# Patient Record
Sex: FEMALE
[De-identification: names, ages, dates, MRNs, and addresses within clinical notes are randomized per-mention and may not be internally consistent; named-entity substitution may affect disease eponyms.]

---

## 2019-09-23 ENCOUNTER — HOSPITAL ENCOUNTER (EMERGENCY)
Dept: HOSPITAL 56 - MW.ED | Age: 59
Discharge: HOME | End: 2019-09-23
Payer: COMMERCIAL
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
## 2019-09-23 DIAGNOSIS — Z23: ICD-10-CM

## 2019-09-23 DIAGNOSIS — S61.217A: Primary | ICD-10-CM

## 2019-09-23 PROCEDURE — 90715 TDAP VACCINE 7 YRS/> IM: CPT

## 2019-09-23 PROCEDURE — 90471 IMMUNIZATION ADMIN: CPT

## 2019-09-23 PROCEDURE — 99282 EMERGENCY DEPT VISIT SF MDM: CPT

## 2019-09-23 PROCEDURE — 12001 RPR S/N/AX/GEN/TRNK 2.5CM/<: CPT

## 2019-09-23 NOTE — EDM.PDOC
ED HPI GENERAL MEDICAL PROBLEM





- General


Chief Complaint: Laceration


Stated Complaint: CUT ON FINGER


Time Seen by Provider: 09/23/19 17:24


Source of Information: Reports: Patient


History Limitations: Reports: No Limitations





- History of Present Illness


INITIAL COMMENTS - FREE TEXT/NARRATIVE: 


HISTORY AND PHYSICAL:





History of present illness:


Patient is a 59-year-old female who presents to the emergency room today with 

complaints of a laceration to the left distal fifth digit. She states she was 

cutting food with a knife when the food had slipped away resulting in a 

laceration. Patient denies any fever, chills, headache, change in vision, 

syncope or near syncope. Denies any chest pain, back pain, shortness of breath 

or cough. Denies any GI or  symptoms.


Unsure of last tetanus up-to-date.





Review of systems: 


As per history of present illness and below otherwise all systems reviewed and 

negative.





Past medical history: 


As per history of present illness and as reviewed below otherwise 

noncontributory.





Surgical history: 


As per history of present illness and as reviewed below otherwise 

noncontributory.





Social history: 


See social history for further information





Family history: 


As per history of present illness and as reviewed below otherwise 

noncontributory.





Physical exam:


General: Well-developed and well-nourished 59-year-old female. Alert and 

oriented. Nontoxic appearing and in no acute distress.


HEENT: Atraumatic, normocephalic, pupils equal and reactive bilaterally, 

negative for conjunctival pallor or scleral icterus, mucous membranes moist, 

trachea midline. No drooling or trismus noted. No meningeal signs. No hot 

potato voice noted. 


Lungs: Clear to auscultation, breath sounds equal bilaterally, chest nontender.


Heart: S1S2, regular rate and rhythm without overt murmur


Abdomen: Soft, nondistended, nontender. 


Skin: 1.3 cm "C" shaped laceration to distal aspect of left 5th digit. The 

laceration is on the pad of the finger and does not extend into the nail bed. 

Otherwise skin is intact, warm, dry. No lesions or rashes noted.


Extremities: Atraumatic, moves all extremities per self without difficulty or 

deficits, negative for cords or calf pain. Neurovascular unremarkable.


Neuro: Awake, alert, oriented. Cranial nerves II through XII unremarkable. 

Cerebellum unremarkable. Motor and sensory unremarkable throughout. Exam 

nonfocal.





Notes:


Area was thoroughly cleansed and irrigated with wound wash. Usual and customary 

procedures were followed for suture placement. 4-0 chromic # 3interrupted 

sutures were applied. Patient tolerated well. Supportive care measures were 

reviewed and discussed. Voices understanding and is agreeable to plan of care. 

Denies any further questions or concerns at this time.





Diagnostics:


None





Therapeutics:


Lidocaine, Tdap





Prescription:


None





Impression: 


Laceration





Plan:


1. Keep the area clean and dry. Continue to monitor for signs of infection. 

Sutures to be removed in 7-10 days.


2. Tylenol and/or ibuprofen as needed for pain management.


3. Please follow-up with your primary care provider in the next 1-2 days. 

Return to the ED as needed and as discussed.





Definitive disposition and diagnosis as appropriate pending reevaluation and 

review of above.








- Related Data


 Allergies











Allergy/AdvReac Type Severity Reaction Status Date / Time


 


No Known Allergies Allergy   Verified 09/23/19 17:33











Home Meds: 


 Home Meds





Anti-Hypertensive  09/23/19 [History]











ED ROS GENERAL





- Review of Systems


Review Of Systems: ROS reveals no pertinent complaints other than HPI.





ED EXAM, SKIN/RASH


Exam: See Below (See dictation)





ED SKIN PROCEDURES





- Laceration/Wound Repair


  ** Finger, 5th digit left


Appearance: Subcutaneous, Linear, Clean


Distal NVT: Neuro & Vascular Intact, No Tendon Injury


Local Anesthetic Volume: 2cc


Skin Prep: Chlorhexidine (Hibiciens), Saline


Saline Irrigation (cc's): 25


Exploration/Debridement/Repair: Wound Explored, In a Bloodless Field, Explored 

to Base, No Foreign Material Found


Closed with: Sutures


Lac/Wound length In cm: 1.3


Suture Size: 4-0


# of Sutures: 4


Suture Type: Interrupted, Simple, Other (Chromic)


Drain Placement: No


Sterile Dressing Applied: Provider


Tetanus Status Addressed: Yes


Complications: No





Course





- Orders/Labs/Meds


Orders: 


 Active Orders 24 hr











 Category Date Time Status


 


 Vaccines to be Administered [RC] PER UNIT ROUTINE Care  09/23/19 17:30 Ordered


 


 Bacitracin [Bacitracin Oint 1 GM] Med  09/23/19 17:30 Once





 1 dose TOP ONETIME ONE   


 


 Diphth,Pertuss(Acell),Tet Vac [Adacel] Med  09/23/19 17:30 Once





 0.5 ml IM .ONCE ONE   


 


 Lidocaine 1% [Xylocaine-MPF 1%] Med  09/23/19 17:30 Once





 2 ml INJECT ONETIME ONE   














Departure





- Departure


Time of Disposition: 17:50


Disposition: Home, Self-Care 01


Clinical Impression: 


 Laceration








- Discharge Information


Instructions:  Laceration Care, Adult, Easy-to-Read


Referrals: 


PCP,Unknown [Primary Care Provider] - 


Forms:  ED Department Discharge


Additional Instructions: 


The following information is given to patients seen in the emergency department 

who are being discharged to home. This information is to outline your options 

for follow-up care. We provide all patients seen in our emergency department 

with a follow-up referral.





The need for follow-up, as well as the timing and circumstances, are variable 

depending upon the specifics of your emergency department visit.





If you don't have a primary care physician on staff, we will provide you with a 

referral. We always advise you to contact your personal physician following an 

emergency department visit to inform them of the circumstance of the visit and 

for follow-up with them and/or the need for any referrals to a consulting 

specialist.





The emergency department will also refer you to a specialist when appropriate. 

This referral assures that you have the opportunity for follow-up care with a 

specialist. All of these measure are taken in an effort to provide you with 

optimal care, which includes your follow-up.





Under all circumstances we always encourage you to contact your private 

physician who remains a resource for coordinating your care. When calling for 

follow-up care, please make the office aware that this follow-up is from your 

recent emergency room visit. If for any reason you are refused follow-up, 

please contact the [facility] Emergency 

Department at [phone] and asked to speak to the emergency department 

charge nurse.





[facility]


Primary Care


[address] [place] [id number]


Phone: [phone]


Fax: [phone]





[address] [address]


Phone: [phone]


Fax: [phone]





1. Keep the area clean and dry. Continue to monitor for signs of infection. 

Sutures to be removed in 7-10 days.


2. Tylenol and/or ibuprofen as needed for pain management.


3. Please follow-up with your primary care provider in the next 1-2 days. 

Return to the ED as needed and as discussed.








- My Orders


Last 24 Hours: 


My Active Orders





09/23/19 17:30


Vaccines to be Administered [RC] PER UNIT ROUTINE 


Bacitracin [Bacitracin Oint 1 GM]   1 dose TOP ONETIME ONE 


Diphth,Pertuss(Acell),Tet Vac [Adacel]   0.5 ml IM .ONCE ONE 


Lidocaine 1% [Xylocaine-MPF 1%]   2 ml INJECT ONETIME ONE 














- Assessment/Plan


Last 24 Hours: 


My Active Orders





09/23/19 17:30


Vaccines to be Administered [RC] PER UNIT ROUTINE 


Bacitracin [Bacitracin Oint 1 GM]   1 dose TOP ONETIME ONE 


Diphth,Pertuss(Acell),Tet Vac [Adacel]   0.5 ml IM .ONCE ONE 


Lidocaine 1% [Xylocaine-MPF 1%]   2 ml INJECT ONETIME ONE